# Patient Record
Sex: MALE | Race: WHITE
[De-identification: names, ages, dates, MRNs, and addresses within clinical notes are randomized per-mention and may not be internally consistent; named-entity substitution may affect disease eponyms.]

---

## 2022-09-23 ENCOUNTER — HOSPITAL ENCOUNTER (OUTPATIENT)
Dept: HOSPITAL 75 - CARD | Age: 82
End: 2022-09-23
Attending: UROLOGY
Payer: MEDICARE

## 2022-09-23 DIAGNOSIS — M47.816: ICD-10-CM

## 2022-09-23 DIAGNOSIS — N20.0: Primary | ICD-10-CM

## 2022-09-23 DIAGNOSIS — Z85.3: ICD-10-CM

## 2022-09-23 DIAGNOSIS — R91.8: ICD-10-CM

## 2022-09-23 PROCEDURE — 78306 BONE IMAGING WHOLE BODY: CPT

## 2022-09-23 PROCEDURE — 74176 CT ABD & PELVIS W/O CONTRAST: CPT

## 2022-09-23 NOTE — DIAGNOSTIC IMAGING REPORT
Exam: Nuclear medicine whole body bone scan.



Date: September 23, 2022.



Indication: 82-year-old male, history of prostate cancer.

Evaluation for bone metastasis.



Comparison: CT abdomen and pelvis September 23, 2022.



Findings:



There is radiotracer uptake at the level of the cervical spine

which is most likely degenerative related. There is degenerative

related radiotracer uptake at the level of both knees. There is

radiotracer uptake in the region of the left ankle in the

bilateral mid feet which also may reflect arthritis. There is no

identified radiotracer avid lesion specifically concerning for a

site of osteoblastic bone metastasis.



Impression:

1. No nuclear medicine evidence of an osteoblastic bone

metastasis.



Dictated by: 



  Dictated on workstation # [id number]

## 2022-09-23 NOTE — DIAGNOSTIC IMAGING REPORT
PROCEDURE: CT abdomen and pelvis without contrast.



TECHNIQUE: Multiple contiguous axial images were obtained through

the abdomen and pelvis without the use of intravenous contrast.

Auto Exposure Controls were utilized during the CT exam to meet

ALARA standards for radiation dose reduction. 



INDICATION:  Newly diagnosed prostate carcinoma.



There are punctate nodules which appear to be related to the

minor fissure in the visualized lower right lung. These measure

up to 0.3 cm in size. There is dense coronary artery

calcification. The descending thoracic aorta is tortuous.



Below the diaphragm, there is no evidence of focal hepatic,

gallbladder or pancreatic lesion. There is no significant biliary

ductal dilatation. There is no evidence of adrenal gland or

splenic lesion. There are several nonobstructing calculi present

within the kidneys bilaterally. Largest stones in the upper pole

of the right kidney reach 0.4 cm in size with stone in the

inferior pole of the left kidney reaching 0.5 cm. Right main

renal artery courses along the anterior margin of the right renal

pelvis and results in distortion, however there is no significant

hydronephrosis. Dominant cyst in the upper pole of the left

kidney measures up to 6.3 cm in diameter with possible

subcentimeter cyst in the mid and lower portions of the right

kidney. There is no evidence of abdominal mesenteric or central

retroperitoneal adenopathy. There is mild aortoiliac

atherosclerotic calcification. Surgical clips are seen in the

left common iliac region. Unenhanced images of the bladder reveal

no abnormality. No discrete prostate or seminal vesicle lesion is

identified. There is surgical anastomosis at the sigmoid colon.

There is subcentimeter lymph node in the left external iliac

chain. There is no focal lytic or sclerotic osseous lesion. There

is appearance spondylolysis on the right at L4-L5 with marked

L4-L5 disc space narrowing, sclerosis and endplate spurring and

advanced L4-L5 degenerative facet arthropathy.



IMPRESSION: Tiny pleural-based nodules in the right midlung are

nonspecific and could be related to scarring.



Below the diaphragm note is made of bilateral nephrolithiasis

without evidence of obstructive uropathy.



Advanced degenerative findings are seen in the lumbar spine

without evidence of osseous metastatic disease or other

indication of metastatic prostate cancer.



Dictated by: 



  Dictated on workstation # [id number]

## 2022-10-18 ENCOUNTER — HOSPITAL ENCOUNTER (OUTPATIENT)
Dept: HOSPITAL 75 - ONC | Age: 82
LOS: 13 days | Discharge: HOME | End: 2022-10-31
Attending: INTERNAL MEDICINE
Payer: MEDICARE

## 2022-10-18 DIAGNOSIS — C61: Primary | ICD-10-CM

## 2022-10-18 PROCEDURE — 99204 OFFICE O/P NEW MOD 45 MIN: CPT

## 2022-10-18 PROCEDURE — 99205 OFFICE O/P NEW HI 60 MIN: CPT

## 2023-01-04 ENCOUNTER — HOSPITAL ENCOUNTER (OUTPATIENT)
Dept: HOSPITAL 75 - PREOP | Age: 83
LOS: 6 days | Discharge: HOME | End: 2023-01-10
Attending: RADIOLOGY
Payer: MEDICARE

## 2023-01-04 VITALS — HEIGHT: 65 IN | BODY MASS INDEX: 33.43 KG/M2 | WEIGHT: 200.62 LBS

## 2023-01-04 DIAGNOSIS — Z01.818: Primary | ICD-10-CM

## 2023-01-11 ENCOUNTER — HOSPITAL ENCOUNTER (OUTPATIENT)
Dept: HOSPITAL 75 - SDC | Age: 83
Discharge: HOME | End: 2023-01-11
Attending: RADIOLOGY
Payer: MEDICARE

## 2023-01-11 VITALS — SYSTOLIC BLOOD PRESSURE: 133 MMHG | DIASTOLIC BLOOD PRESSURE: 73 MMHG

## 2023-01-11 VITALS — DIASTOLIC BLOOD PRESSURE: 81 MMHG | SYSTOLIC BLOOD PRESSURE: 137 MMHG

## 2023-01-11 VITALS — DIASTOLIC BLOOD PRESSURE: 81 MMHG | SYSTOLIC BLOOD PRESSURE: 139 MMHG

## 2023-01-11 VITALS — DIASTOLIC BLOOD PRESSURE: 61 MMHG | SYSTOLIC BLOOD PRESSURE: 116 MMHG

## 2023-01-11 VITALS — DIASTOLIC BLOOD PRESSURE: 55 MMHG | SYSTOLIC BLOOD PRESSURE: 120 MMHG

## 2023-01-11 VITALS — WEIGHT: 200.62 LBS | HEIGHT: 65 IN | BODY MASS INDEX: 33.43 KG/M2

## 2023-01-11 VITALS — DIASTOLIC BLOOD PRESSURE: 77 MMHG | SYSTOLIC BLOOD PRESSURE: 139 MMHG

## 2023-01-11 VITALS — SYSTOLIC BLOOD PRESSURE: 122 MMHG | DIASTOLIC BLOOD PRESSURE: 86 MMHG

## 2023-01-11 DIAGNOSIS — C61: Primary | ICD-10-CM

## 2023-01-11 DIAGNOSIS — E66.9: ICD-10-CM

## 2023-01-11 PROCEDURE — 87081 CULTURE SCREEN ONLY: CPT

## 2023-01-11 NOTE — PROGRESS NOTE-PRE OPERATIVE
Pre-Operative Progress Note


Date of Available H&P:  [place] 3, 2023


Date H&P Reviewed:  Jan 11, 2023


Time H&P Reviewed:  06:52


Changes from last HP


see attached H&P


Pre-Operative Diagnosis:  Prostate cancer cT2c, PSA 9, [place] 9-10 / GG 5











MYERS,DUANE E MD               Jan 11, 2023 06:52

## 2023-01-11 NOTE — PROGRESS NOTE-POST OPERATIVE
Post-Operative Progess Note


Surgeon (s)/Assistant (s)


Surgeon


DUANE MYERS MD


Assistant:  N/A





Pre-Operative Diagnosis


Prostate cancer cT2c, PSA 9, [place] 9-10 / GG 5





Post-Operative Diagnosis





Same as preop





Procedure & Operative Findings


Date of Procedure


1/11/23


Procedure Performed/Findings


(1) Injection of biodegradable prostate-rectal spacer utilizing the Barrigel 

system


Anesthesia Type


General





Estimated Blood Loss


Estimated blood loss (mL):  minimal





Specimens/Packing


Specimens Removed


None


Packing:  


None











MYERS,DUANE E MD               Jan 11, 2023 08:59

## 2023-01-11 NOTE — DISCHARGE INST-SIMPLE/STANDARD
Discharge Inst-Standard


Reconcile Patient Problems


Problems Reviewed?:  Yes





Discharge Medications


New, Converted or Re-Newed RX:  Other (Patient has antibiotic at home with 

instructions.)





Patient Instructions/Follow Up


Plan of Care/Instructions/FU:  


Treatment planning CT scan at [facility] 1/24/23 at 1:00 pm


Activity as Tolerated:  Yes


Discharge Diet:  No Restrictions











MYERS,DUANE E MD               Jan 11, 2023 06:55

## 2023-01-11 NOTE — ANESTHESIA-GENERAL POST-OP
General


Patient Condition


Mental Status/LOC:  Same as Preop


Cardiovascular:  Satisfactory


Nausea/Vomiting:  Absent


Respiratory:  Satisfactory


Pain:  Controlled


Complications:  Absent





Post Op Complications


Complications


None





Follow Up Care/Instructions


Patient Instructions


None needed.





Anesthesia/Patient Condition


Patient Condition


Patient is doing well, no complaints, stable vital signs, no apparent adverse 

anesthesia problems.   


No complications reported per nursing.











MANI ALVAREZ CRNA            Jan 11, 2023 08:29

## 2023-01-31 ENCOUNTER — HOSPITAL ENCOUNTER (OUTPATIENT)
Dept: HOSPITAL 75 - ONC | Age: 83
Discharge: HOME | End: 2023-01-31
Attending: INTERNAL MEDICINE
Payer: MEDICARE

## 2023-01-31 DIAGNOSIS — Z51.0: Primary | ICD-10-CM

## 2023-01-31 DIAGNOSIS — C61: ICD-10-CM

## 2023-01-31 PROCEDURE — 84153 ASSAY OF PSA TOTAL: CPT

## 2023-01-31 PROCEDURE — 77338 DESIGN MLC DEVICE FOR IMRT: CPT

## 2023-01-31 PROCEDURE — 96402 CHEMO HORMON ANTINEOPL SQ/IM: CPT

## 2023-01-31 PROCEDURE — 77385: CPT

## 2023-01-31 PROCEDURE — 77301 RADIOTHERAPY DOSE PLAN IMRT: CPT

## 2023-01-31 PROCEDURE — 77334 RADIATION TREATMENT AID(S): CPT

## 2023-01-31 PROCEDURE — 77300 RADIATION THERAPY DOSE PLAN: CPT

## 2023-02-28 ENCOUNTER — HOSPITAL ENCOUNTER (OUTPATIENT)
Dept: HOSPITAL 75 - ONC | Age: 83
Discharge: HOME | End: 2023-02-28
Attending: INTERNAL MEDICINE
Payer: MEDICARE

## 2023-02-28 DIAGNOSIS — K21.9: ICD-10-CM

## 2023-02-28 DIAGNOSIS — C61: ICD-10-CM

## 2023-02-28 DIAGNOSIS — Z51.0: Primary | ICD-10-CM

## 2023-02-28 DIAGNOSIS — I10: ICD-10-CM

## 2023-02-28 DIAGNOSIS — E03.9: ICD-10-CM

## 2023-02-28 PROCEDURE — 77385: CPT

## 2023-02-28 PROCEDURE — 77336 RADIATION PHYSICS CONSULT: CPT

## 2023-03-29 LAB
ALBUMIN SERPL-MCNC: 3.6 GM/DL (ref 3.2–4.5)
ALP SERPL-CCNC: 47 U/L (ref 40–136)
ALT SERPL-CCNC: 18 U/L (ref 0–55)
BASOPHILS # BLD AUTO: 0 10^3/UL (ref 0–0.1)
BASOPHILS NFR BLD AUTO: 1 % (ref 0–10)
BILIRUB SERPL-MCNC: 0.3 MG/DL (ref 0.1–1)
BUN/CREAT SERPL: 26
CALCIUM SERPL-MCNC: 8.7 MG/DL (ref 8.5–10.1)
CHLORIDE SERPL-SCNC: 110 MMOL/L (ref 98–107)
CO2 SERPL-SCNC: 22 MMOL/L (ref 21–32)
CREAT SERPL-MCNC: 0.81 MG/DL (ref 0.6–1.3)
EOSINOPHIL # BLD AUTO: 0.2 10^3/UL (ref 0–0.3)
EOSINOPHIL NFR BLD AUTO: 5 % (ref 0–10)
GFR SERPLBLD BASED ON 1.73 SQ M-ARVRAT: 88 ML/MIN
GLUCOSE SERPL-MCNC: 97 MG/DL (ref 70–105)
HCT VFR BLD CALC: 36 % (ref 40–54)
HGB BLD-MCNC: 12.4 G/DL (ref 13.3–17.7)
LYMPHOCYTES # BLD AUTO: 0.5 10^3/UL (ref 1–4)
LYMPHOCYTES NFR BLD AUTO: 12 % (ref 12–44)
MANUAL DIFFERENTIAL PERFORMED BLD QL: NO
MCH RBC QN AUTO: 32 PG (ref 25–34)
MCHC RBC AUTO-ENTMCNC: 34 G/DL (ref 32–36)
MCV RBC AUTO: 93 FL (ref 80–99)
MONOCYTES # BLD AUTO: 0.5 10^3/UL (ref 0–1)
MONOCYTES NFR BLD AUTO: 12 % (ref 0–12)
NEUTROPHILS # BLD AUTO: 2.9 10^3/UL (ref 1.8–7.8)
NEUTROPHILS NFR BLD AUTO: 70 % (ref 42–75)
PLATELET # BLD: 217 10^3/UL (ref 130–400)
PMV BLD AUTO: 9.3 FL (ref 9–12.2)
POTASSIUM SERPL-SCNC: 3.8 MMOL/L (ref 3.6–5)
PROT SERPL-MCNC: 6 GM/DL (ref 6.4–8.2)
SODIUM SERPL-SCNC: 139 MMOL/L (ref 135–145)
WBC # BLD AUTO: 4.2 10^3/UL (ref 4.3–11)

## 2023-03-31 ENCOUNTER — HOSPITAL ENCOUNTER (OUTPATIENT)
Dept: HOSPITAL 75 - ONC | Age: 83
Discharge: HOME | End: 2023-03-31
Attending: INTERNAL MEDICINE
Payer: MEDICARE

## 2023-03-31 DIAGNOSIS — C61: Primary | ICD-10-CM

## 2023-03-31 PROCEDURE — 77336 RADIATION PHYSICS CONSULT: CPT

## 2023-03-31 PROCEDURE — 36415 COLL VENOUS BLD VENIPUNCTURE: CPT

## 2023-03-31 PROCEDURE — 85025 COMPLETE CBC W/AUTO DIFF WBC: CPT

## 2023-03-31 PROCEDURE — 84153 ASSAY OF PSA TOTAL: CPT

## 2023-03-31 PROCEDURE — 77385: CPT

## 2023-03-31 PROCEDURE — 80053 COMPREHEN METABOLIC PANEL: CPT

## 2023-03-31 PROCEDURE — 96402 CHEMO HORMON ANTINEOPL SQ/IM: CPT

## 2023-03-31 PROCEDURE — 77300 RADIATION THERAPY DOSE PLAN: CPT

## 2023-04-05 ENCOUNTER — HOSPITAL ENCOUNTER (OUTPATIENT)
Dept: HOSPITAL 75 - ONC | Age: 83
LOS: 25 days | Discharge: HOME | End: 2023-04-30
Attending: INTERNAL MEDICINE
Payer: MEDICARE

## 2023-04-05 DIAGNOSIS — Z51.0: Primary | ICD-10-CM

## 2023-04-05 DIAGNOSIS — C61: ICD-10-CM

## 2023-04-05 PROCEDURE — 77385: CPT

## 2023-04-05 PROCEDURE — 77336 RADIATION PHYSICS CONSULT: CPT

## 2023-06-21 ENCOUNTER — HOSPITAL ENCOUNTER (OUTPATIENT)
Dept: HOSPITAL 75 - ONC | Age: 83
LOS: 9 days | Discharge: HOME | End: 2023-06-30
Attending: INTERNAL MEDICINE
Payer: COMMERCIAL

## 2023-06-21 DIAGNOSIS — C61: Primary | ICD-10-CM

## 2023-06-21 PROCEDURE — 84153 ASSAY OF PSA TOTAL: CPT

## 2023-06-21 PROCEDURE — 36415 COLL VENOUS BLD VENIPUNCTURE: CPT
